# Patient Record
Sex: FEMALE | Race: BLACK OR AFRICAN AMERICAN | NOT HISPANIC OR LATINO | URBAN - METROPOLITAN AREA
[De-identification: names, ages, dates, MRNs, and addresses within clinical notes are randomized per-mention and may not be internally consistent; named-entity substitution may affect disease eponyms.]

---

## 2022-03-02 ENCOUNTER — INPATIENT (INPATIENT)
Facility: HOSPITAL | Age: 27
LOS: 2 days | Discharge: HOME | End: 2022-03-05
Attending: INTERNAL MEDICINE | Admitting: INTERNAL MEDICINE
Payer: MEDICAID

## 2022-03-02 VITALS
SYSTOLIC BLOOD PRESSURE: 116 MMHG | HEIGHT: 66 IN | OXYGEN SATURATION: 100 % | HEART RATE: 113 BPM | DIASTOLIC BLOOD PRESSURE: 76 MMHG | RESPIRATION RATE: 24 BRPM | TEMPERATURE: 98 F

## 2022-03-02 DIAGNOSIS — Z33.2 ENCOUNTER FOR ELECTIVE TERMINATION OF PREGNANCY: Chronic | ICD-10-CM

## 2022-03-02 LAB
ALBUMIN SERPL ELPH-MCNC: 5 G/DL — SIGNIFICANT CHANGE UP (ref 3.3–5)
ALP SERPL-CCNC: 68 U/L — SIGNIFICANT CHANGE UP (ref 40–120)
ALT FLD-CCNC: 62 U/L — HIGH (ref 4–33)
ANION GAP SERPL CALC-SCNC: 19 MMOL/L — HIGH (ref 7–14)
APPEARANCE UR: ABNORMAL
AST SERPL-CCNC: 37 U/L — HIGH (ref 4–32)
BACTERIA # UR AUTO: ABNORMAL
BASOPHILS # BLD AUTO: 0.01 K/UL — SIGNIFICANT CHANGE UP (ref 0–0.2)
BASOPHILS NFR BLD AUTO: 0.2 % — SIGNIFICANT CHANGE UP (ref 0–2)
BILIRUB SERPL-MCNC: 0.6 MG/DL — SIGNIFICANT CHANGE UP (ref 0.2–1.2)
BILIRUB UR-MCNC: NEGATIVE — SIGNIFICANT CHANGE UP
BUN SERPL-MCNC: 13 MG/DL — SIGNIFICANT CHANGE UP (ref 7–23)
CALCIUM SERPL-MCNC: 9.3 MG/DL — SIGNIFICANT CHANGE UP (ref 8.4–10.5)
CHLORIDE SERPL-SCNC: 98 MMOL/L — SIGNIFICANT CHANGE UP (ref 98–107)
CO2 SERPL-SCNC: 17 MMOL/L — LOW (ref 22–31)
COLOR SPEC: YELLOW — SIGNIFICANT CHANGE UP
CREAT SERPL-MCNC: 0.69 MG/DL — SIGNIFICANT CHANGE UP (ref 0.5–1.3)
DIFF PNL FLD: ABNORMAL
EGFR: 122 ML/MIN/1.73M2 — SIGNIFICANT CHANGE UP
EOSINOPHIL # BLD AUTO: 0 K/UL — SIGNIFICANT CHANGE UP (ref 0–0.5)
EOSINOPHIL NFR BLD AUTO: 0 % — SIGNIFICANT CHANGE UP (ref 0–6)
EPI CELLS # UR: 15 /HPF — HIGH (ref 0–5)
GLUCOSE SERPL-MCNC: 141 MG/DL — HIGH (ref 70–99)
GLUCOSE UR QL: NEGATIVE — SIGNIFICANT CHANGE UP
HCT VFR BLD CALC: 45.9 % — HIGH (ref 34.5–45)
HGB BLD-MCNC: 15.6 G/DL — HIGH (ref 11.5–15.5)
HYALINE CASTS # UR AUTO: 6 /LPF — SIGNIFICANT CHANGE UP (ref 0–7)
IANC: 4.94 K/UL — SIGNIFICANT CHANGE UP (ref 1.5–8.5)
IMM GRANULOCYTES NFR BLD AUTO: 0.4 % — SIGNIFICANT CHANGE UP (ref 0–1.5)
KETONES UR-MCNC: ABNORMAL
LEUKOCYTE ESTERASE UR-ACNC: ABNORMAL
LYMPHOCYTES # BLD AUTO: 0.4 K/UL — LOW (ref 1–3.3)
LYMPHOCYTES # BLD AUTO: 7.3 % — LOW (ref 13–44)
MCHC RBC-ENTMCNC: 28.9 PG — SIGNIFICANT CHANGE UP (ref 27–34)
MCHC RBC-ENTMCNC: 34 GM/DL — SIGNIFICANT CHANGE UP (ref 32–36)
MCV RBC AUTO: 85.2 FL — SIGNIFICANT CHANGE UP (ref 80–100)
MONOCYTES # BLD AUTO: 0.12 K/UL — SIGNIFICANT CHANGE UP (ref 0–0.9)
MONOCYTES NFR BLD AUTO: 2.2 % — SIGNIFICANT CHANGE UP (ref 2–14)
NEUTROPHILS # BLD AUTO: 4.94 K/UL — SIGNIFICANT CHANGE UP (ref 1.8–7.4)
NEUTROPHILS NFR BLD AUTO: 89.9 % — HIGH (ref 43–77)
NITRITE UR-MCNC: NEGATIVE — SIGNIFICANT CHANGE UP
NRBC # BLD: 0 /100 WBCS — SIGNIFICANT CHANGE UP
NRBC # FLD: 0 K/UL — SIGNIFICANT CHANGE UP
NT-PROBNP SERPL-SCNC: 17 PG/ML — SIGNIFICANT CHANGE UP
PH UR: 6 — SIGNIFICANT CHANGE UP (ref 5–8)
PLATELET # BLD AUTO: 244 K/UL — SIGNIFICANT CHANGE UP (ref 150–400)
POTASSIUM SERPL-MCNC: 3.7 MMOL/L — SIGNIFICANT CHANGE UP (ref 3.5–5.3)
POTASSIUM SERPL-SCNC: 3.7 MMOL/L — SIGNIFICANT CHANGE UP (ref 3.5–5.3)
PROT SERPL-MCNC: 7.8 G/DL — SIGNIFICANT CHANGE UP (ref 6–8.3)
PROT UR-MCNC: ABNORMAL
RBC # BLD: 5.39 M/UL — HIGH (ref 3.8–5.2)
RBC # FLD: 13.2 % — SIGNIFICANT CHANGE UP (ref 10.3–14.5)
RBC CASTS # UR COMP ASSIST: 3 /HPF — SIGNIFICANT CHANGE UP (ref 0–4)
SODIUM SERPL-SCNC: 134 MMOL/L — LOW (ref 135–145)
SP GR SPEC: 1.03 — SIGNIFICANT CHANGE UP (ref 1–1.05)
TROPONIN T, HIGH SENSITIVITY RESULT: <6 NG/L — SIGNIFICANT CHANGE UP
UROBILINOGEN FLD QL: ABNORMAL
WBC # BLD: 5.49 K/UL — SIGNIFICANT CHANGE UP (ref 3.8–10.5)
WBC # FLD AUTO: 5.49 K/UL — SIGNIFICANT CHANGE UP (ref 3.8–10.5)
WBC UR QL: 16 /HPF — HIGH (ref 0–5)

## 2022-03-02 PROCEDURE — 99285 EMERGENCY DEPT VISIT HI MDM: CPT | Mod: 25

## 2022-03-02 PROCEDURE — 93010 ELECTROCARDIOGRAM REPORT: CPT

## 2022-03-02 NOTE — ED PROVIDER NOTE - CARE PLAN
1 Principal Discharge DX:	Dyspnea   Principal Discharge DX:	Acute respiratory failure with hypoxia  Secondary Diagnosis:	Viral pneumonia

## 2022-03-02 NOTE — ED PROVIDER NOTE - OBJECTIVE STATEMENT
27 yr old female with hx of chronic bronchitis, childhood asthma presents from [facility] for dyspnea, tachypnea and high HR.  Pt states she has episodes of hyperventilation in past but this is new since Monday.  Subjective fevers.  Denies hx of DVT/PE.

## 2022-03-02 NOTE — ED ADULT TRIAGE NOTE - CHIEF COMPLAINT QUOTE
pt c/o SOB, dry cough, CP, diarrhea x 3 days. Sent in from urgent care for further eval. Pt had negative flu and covid tests done at urgent care. Pt fully vaccinated. PMH asthma, bronchitis.

## 2022-03-02 NOTE — ED PROVIDER NOTE - NSICDXPASTMEDICALHX_GEN_ALL_CORE_FT
PAST MEDICAL HISTORY:  Asthma     Chronic bronchitis     Trichomonal vulvovaginitis     UTI (lower urinary tract infection)

## 2022-03-02 NOTE — ED PROVIDER NOTE - NSICDXFAMILYHX_GEN_ALL_CORE_FT
FAMILY HISTORY:  Family history of hypertension    Grandparent  Still living? Unknown  Family history of diabetes mellitus, Age at diagnosis: Age Unknown

## 2022-03-02 NOTE — ED ADULT NURSE NOTE - OBJECTIVE STATEMENT
26y/o female aaox4 and ambulatory c/o sod, [place], and tachycardia. PT denies abdominal pain, N+V, diarrhea. Vital signs as noted. 20g in RAC; labs collected and sent as per MD order.

## 2022-03-02 NOTE — ED ADULT NURSE NOTE - NSIMPLEMENTINTERV_GEN_ALL_ED
Implemented All Universal Safety Interventions:  [place] to call system. Call bell, personal items and telephone within reach. Instruct patient to call for assistance. Room bathroom lighting operational. Non-slip footwear when patient is off stretcher. Physically safe environment: no spills, clutter or unnecessary equipment. Stretcher in lowest position, wheels locked, appropriate side rails in place.

## 2022-03-02 NOTE — ED PROVIDER NOTE - PROGRESS NOTE DETAILS
PA Valane: Pt maintaining an O2 sat of 97 at rest off of supplemental O2 but is breathing at 24. Hypoxic to 90 with ambulation. CTA non-diagnostic for PE, D-Dimer ordered. Procalcitonin ordered to eval for viral vs bacterial etiology.

## 2022-03-03 DIAGNOSIS — J45.901 UNSPECIFIED ASTHMA WITH (ACUTE) EXACERBATION: ICD-10-CM

## 2022-03-03 DIAGNOSIS — R79.89 OTHER SPECIFIED ABNORMAL FINDINGS OF BLOOD CHEMISTRY: ICD-10-CM

## 2022-03-03 DIAGNOSIS — R19.7 DIARRHEA, UNSPECIFIED: ICD-10-CM

## 2022-03-03 DIAGNOSIS — Z29.9 ENCOUNTER FOR PROPHYLACTIC MEASURES, UNSPECIFIED: ICD-10-CM

## 2022-03-03 DIAGNOSIS — J96.01 ACUTE RESPIRATORY FAILURE WITH HYPOXIA: ICD-10-CM

## 2022-03-03 DIAGNOSIS — J10.00 INFLUENZA DUE TO OTHER IDENTIFIED INFLUENZA VIRUS WITH UNSPECIFIED TYPE OF PNEUMONIA: ICD-10-CM

## 2022-03-03 DIAGNOSIS — R31.9 HEMATURIA, UNSPECIFIED: ICD-10-CM

## 2022-03-03 LAB
ALBUMIN SERPL ELPH-MCNC: 4.8 G/DL — SIGNIFICANT CHANGE UP (ref 3.3–5)
ALP SERPL-CCNC: 60 U/L — SIGNIFICANT CHANGE UP (ref 40–120)
ALT FLD-CCNC: 48 U/L — HIGH (ref 4–33)
ANION GAP SERPL CALC-SCNC: 15 MMOL/L — HIGH (ref 7–14)
ANISOCYTOSIS BLD QL: SLIGHT — SIGNIFICANT CHANGE UP
AST SERPL-CCNC: 30 U/L — SIGNIFICANT CHANGE UP (ref 4–32)
B PERT DNA SPEC QL NAA+PROBE: SIGNIFICANT CHANGE UP
B PERT+PARAPERT DNA PNL SPEC NAA+PROBE: SIGNIFICANT CHANGE UP
BASOPHILS # BLD AUTO: 0 K/UL — SIGNIFICANT CHANGE UP (ref 0–0.2)
BASOPHILS NFR BLD AUTO: 0 % — SIGNIFICANT CHANGE UP (ref 0–2)
BILIRUB SERPL-MCNC: 0.5 MG/DL — SIGNIFICANT CHANGE UP (ref 0.2–1.2)
BLOOD GAS VENOUS COMPREHENSIVE RESULT: SIGNIFICANT CHANGE UP
BORDETELLA PARAPERTUSSIS (RAPRVP): SIGNIFICANT CHANGE UP
BUN SERPL-MCNC: 14 MG/DL — SIGNIFICANT CHANGE UP (ref 7–23)
C PNEUM DNA SPEC QL NAA+PROBE: SIGNIFICANT CHANGE UP
CALCIUM SERPL-MCNC: 9.2 MG/DL — SIGNIFICANT CHANGE UP (ref 8.4–10.5)
CHLORIDE SERPL-SCNC: 101 MMOL/L — SIGNIFICANT CHANGE UP (ref 98–107)
CO2 SERPL-SCNC: 19 MMOL/L — LOW (ref 22–31)
CREAT SERPL-MCNC: 0.67 MG/DL — SIGNIFICANT CHANGE UP (ref 0.5–1.3)
CRP SERPL-MCNC: 9.3 MG/L — HIGH
D DIMER BLD IA.RAPID-MCNC: 406 NG/ML DDU — HIGH
EGFR: 123 ML/MIN/1.73M2 — SIGNIFICANT CHANGE UP
EOSINOPHIL # BLD AUTO: 0 K/UL — SIGNIFICANT CHANGE UP (ref 0–0.5)
EOSINOPHIL NFR BLD AUTO: 0 % — SIGNIFICANT CHANGE UP (ref 0–6)
ERYTHROCYTE [SEDIMENTATION RATE] IN BLOOD: 16 MM/HR — SIGNIFICANT CHANGE UP (ref 4–25)
FLUAV H1 2009 PAND RNA SPEC QL NAA+PROBE: DETECTED
FLUBV RNA SPEC QL NAA+PROBE: SIGNIFICANT CHANGE UP
GIANT PLATELETS BLD QL SMEAR: PRESENT — SIGNIFICANT CHANGE UP
GLUCOSE SERPL-MCNC: 135 MG/DL — HIGH (ref 70–99)
HADV DNA SPEC QL NAA+PROBE: SIGNIFICANT CHANGE UP
HCOV 229E RNA SPEC QL NAA+PROBE: SIGNIFICANT CHANGE UP
HCOV HKU1 RNA SPEC QL NAA+PROBE: SIGNIFICANT CHANGE UP
HCOV NL63 RNA SPEC QL NAA+PROBE: SIGNIFICANT CHANGE UP
HCOV OC43 RNA SPEC QL NAA+PROBE: SIGNIFICANT CHANGE UP
HCT VFR BLD CALC: 43.2 % — SIGNIFICANT CHANGE UP (ref 34.5–45)
HGB BLD-MCNC: 14.5 G/DL — SIGNIFICANT CHANGE UP (ref 11.5–15.5)
HMPV RNA SPEC QL NAA+PROBE: SIGNIFICANT CHANGE UP
HPIV1 RNA SPEC QL NAA+PROBE: SIGNIFICANT CHANGE UP
HPIV2 RNA SPEC QL NAA+PROBE: SIGNIFICANT CHANGE UP
HPIV3 RNA SPEC QL NAA+PROBE: SIGNIFICANT CHANGE UP
HPIV4 RNA SPEC QL NAA+PROBE: SIGNIFICANT CHANGE UP
IANC: 1.96 K/UL — SIGNIFICANT CHANGE UP (ref 1.5–8.5)
LYMPHOCYTES # BLD AUTO: 0.36 K/UL — LOW (ref 1–3.3)
LYMPHOCYTES # BLD AUTO: 12 % — LOW (ref 13–44)
M PNEUMO DNA SPEC QL NAA+PROBE: SIGNIFICANT CHANGE UP
MAGNESIUM SERPL-MCNC: 2 MG/DL — SIGNIFICANT CHANGE UP (ref 1.6–2.6)
MANUAL SMEAR VERIFICATION: SIGNIFICANT CHANGE UP
MCHC RBC-ENTMCNC: 28.5 PG — SIGNIFICANT CHANGE UP (ref 27–34)
MCHC RBC-ENTMCNC: 33.6 GM/DL — SIGNIFICANT CHANGE UP (ref 32–36)
MCV RBC AUTO: 84.9 FL — SIGNIFICANT CHANGE UP (ref 80–100)
MONOCYTES # BLD AUTO: 0.31 K/UL — SIGNIFICANT CHANGE UP (ref 0–0.9)
MONOCYTES NFR BLD AUTO: 10.2 % — SIGNIFICANT CHANGE UP (ref 2–14)
NEUTROPHILS # BLD AUTO: 2.11 K/UL — SIGNIFICANT CHANGE UP (ref 1.8–7.4)
NEUTROPHILS NFR BLD AUTO: 68.5 % — SIGNIFICANT CHANGE UP (ref 43–77)
NEUTS BAND # BLD: 1.9 % — SIGNIFICANT CHANGE UP (ref 0–6)
PHOSPHATE SERPL-MCNC: 2.9 MG/DL — SIGNIFICANT CHANGE UP (ref 2.5–4.5)
PLAT MORPH BLD: NORMAL — SIGNIFICANT CHANGE UP
PLATELET # BLD AUTO: 225 K/UL — SIGNIFICANT CHANGE UP (ref 150–400)
PLATELET COUNT - ESTIMATE: NORMAL — SIGNIFICANT CHANGE UP
POIKILOCYTOSIS BLD QL AUTO: SLIGHT — SIGNIFICANT CHANGE UP
POTASSIUM SERPL-MCNC: 4.1 MMOL/L — SIGNIFICANT CHANGE UP (ref 3.5–5.3)
POTASSIUM SERPL-SCNC: 4.1 MMOL/L — SIGNIFICANT CHANGE UP (ref 3.5–5.3)
PROCALCITONIN SERPL-MCNC: 0.02 NG/ML — SIGNIFICANT CHANGE UP (ref 0.02–0.1)
PROT SERPL-MCNC: 7.8 G/DL — SIGNIFICANT CHANGE UP (ref 6–8.3)
RAPID RVP RESULT: DETECTED
RBC # BLD: 5.09 M/UL — SIGNIFICANT CHANGE UP (ref 3.8–5.2)
RBC # FLD: 13 % — SIGNIFICANT CHANGE UP (ref 10.3–14.5)
RBC BLD AUTO: ABNORMAL
RSV RNA SPEC QL NAA+PROBE: SIGNIFICANT CHANGE UP
RV+EV RNA SPEC QL NAA+PROBE: SIGNIFICANT CHANGE UP
SARS-COV-2 RNA SPEC QL NAA+PROBE: SIGNIFICANT CHANGE UP
SMUDGE CELLS # BLD: PRESENT — SIGNIFICANT CHANGE UP
SODIUM SERPL-SCNC: 135 MMOL/L — SIGNIFICANT CHANGE UP (ref 135–145)
TARGETS BLD QL SMEAR: SLIGHT — SIGNIFICANT CHANGE UP
VARIANT LYMPHS # BLD: 7.4 % — HIGH (ref 0–6)
WBC # BLD: 3 K/UL — LOW (ref 3.8–10.5)
WBC # FLD AUTO: 3 K/UL — LOW (ref 3.8–10.5)

## 2022-03-03 PROCEDURE — 99223 1ST HOSP IP/OBS HIGH 75: CPT

## 2022-03-03 PROCEDURE — 74177 CT ABD & PELVIS W/CONTRAST: CPT | Mod: 26

## 2022-03-03 PROCEDURE — 71275 CT ANGIOGRAPHY CHEST: CPT | Mod: 26,MA

## 2022-03-03 RX ORDER — IPRATROPIUM BROMIDE 0.2 MG/ML
500 SOLUTION, NON-ORAL INHALATION EVERY 6 HOURS
Refills: 0 | Status: DISCONTINUED | OUTPATIENT
Start: 2022-03-03 | End: 2022-03-05

## 2022-03-03 RX ORDER — INFLUENZA VIRUS VACCINE 15; 15; 15; 15 UG/.5ML; UG/.5ML; UG/.5ML; UG/.5ML
0.5 SUSPENSION INTRAMUSCULAR ONCE
Refills: 0 | Status: DISCONTINUED | OUTPATIENT
Start: 2022-03-03 | End: 2022-03-05

## 2022-03-03 RX ORDER — ACETAMINOPHEN 500 MG
650 TABLET ORAL EVERY 6 HOURS
Refills: 0 | Status: DISCONTINUED | OUTPATIENT
Start: 2022-03-03 | End: 2022-03-05

## 2022-03-03 RX ORDER — DEXAMETHASONE 0.5 MG/5ML
6 ELIXIR ORAL ONCE
Refills: 0 | Status: COMPLETED | OUTPATIENT
Start: 2022-03-03 | End: 2022-03-03

## 2022-03-03 RX ORDER — SODIUM CHLORIDE 9 MG/ML
1000 INJECTION, SOLUTION INTRAVENOUS
Refills: 0 | Status: DISCONTINUED | OUTPATIENT
Start: 2022-03-03 | End: 2022-03-05

## 2022-03-03 RX ORDER — LANOLIN ALCOHOL/MO/W.PET/CERES
3 CREAM (GRAM) TOPICAL AT BEDTIME
Refills: 0 | Status: DISCONTINUED | OUTPATIENT
Start: 2022-03-03 | End: 2022-03-05

## 2022-03-03 RX ADMIN — SODIUM CHLORIDE 75 MILLILITER(S): 9 INJECTION, SOLUTION INTRAVENOUS at 21:22

## 2022-03-03 RX ADMIN — Medication 100 MILLIGRAM(S): at 22:35

## 2022-03-03 RX ADMIN — Medication 500 MICROGRAM(S): at 10:38

## 2022-03-03 RX ADMIN — Medication 6 MILLIGRAM(S): at 04:12

## 2022-03-03 RX ADMIN — Medication 500 MICROGRAM(S): at 17:57

## 2022-03-03 RX ADMIN — Medication 75 MILLIGRAM(S): at 07:31

## 2022-03-03 RX ADMIN — Medication 40 MILLIGRAM(S): at 21:30

## 2022-03-03 RX ADMIN — Medication 500 MICROGRAM(S): at 23:05

## 2022-03-03 RX ADMIN — SODIUM CHLORIDE 75 MILLILITER(S): 9 INJECTION, SOLUTION INTRAVENOUS at 07:31

## 2022-03-03 RX ADMIN — Medication 75 MILLIGRAM(S): at 17:57

## 2022-03-03 RX ADMIN — Medication 40 MILLIGRAM(S): at 10:38

## 2022-03-03 NOTE — H&P ADULT - HISTORY OF PRESENT ILLNESS
This is a 26 y/o F with multiple complaints. She has hx of asthma since childhood, presented to the ED for new onset shortness of breath started 4 days ago. The patient works in a medical office. She states she started having a dry cough starting on 4 days ago, then the next day she had developed new onset body aches throughout, chest pains and feeling lethargic. Saliva when coughed out has tiny specs of blood. Denies phlegm The patient also had shortness of breath with exertion (she walked to the bathroom and back with no issues in the ED but stated she had shortness of breath. She is covid vaccinated x2 and flu vaccinated. The patient also endorsed n/v around the same time. She reports eating sushi 2 weeks ago. Diarrhea occurred today and had gone 5 times today, also stated that it is mixed with blood, however she is currently on her period. States that she never had blood in stool before. Denies hx of travel. Denies fevers, highest checked at home was 99F.

## 2022-03-03 NOTE — H&P ADULT - PROBLEM SELECTOR PLAN 1
Assessment:  - patient presented for new onset shortness of breath with associated diarrhea and joint pains  - suspicious for viral infection  - PCR for common viruses and covid pending  - CTA shows Scattered sub-cm nodular groundglass opacities, most pronounced in the right middle and right lower lobe. Findings nonspecific for atypical viral pneumonia.    Plan:  - will start solumedrol 40mg Q12hr, titrate down as needed  - patient states she cannot tolerate albuterol because it causes tachycardia. Patient refusing albuterol. Will give atrovent for now  - f/u covid19 test, PCR panel  - monitor respiratory status - currently on RA  - hold abx for now since there is no single consolidation on imaging, vital signs stable and afebrile, will f/u with procal level Assessment:  - patient presented for new onset shortness of breath with associated diarrhea and joint pains  - suspicious for viral infection  - PCR for common viruses and covid pending  - CTA shows Scattered sub-cm nodular groundglass opacities, most pronounced in the right middle and right lower lobe. Findings nonspecific for atypical viral pneumonia.    Plan:  - will start solumedrol 40mg Q12hr, titrate down as needed  - patient states she cannot tolerate albuterol because it causes tachycardia. Patient refusing albuterol. Will give atrovent for now  - f/u covid19 test, PCR panel  - monitor respiratory status - currently on RA ****update: Flu positive***  - start on tamiflu for 5 days  - hold abx for now since there is no single consolidation on imaging, vital signs stable and afebrile, will f/u with procal level Assessment:  - patient is flu A positive on PCR  - CTA showed sub cm ground glass opacities  - likely cause of asthma exacerbation    Plan:  - start tamiflu for 5 days  - rest of plan for asthma exacerbation as below

## 2022-03-03 NOTE — PATIENT PROFILE ADULT - FALL HARM RISK - RISK INTERVENTIONS
Bed in lowest position, wheels locked, appropriate side rails in place/Call bell, personal items and telephone in reach/Instruct patient to call for assistance before getting out of bed or chair/[place] to call system/Physically safe environment - no spills, clutter or unnecessary equipment

## 2022-03-03 NOTE — H&P ADULT - NSHPPHYSICALEXAM_GEN_ALL_CORE
PHYSICAL EXAM:  VITALS: Vital Signs Last 24 Hrs  T(C): 36.7 (03 Mar 2022 04:06), Max: 36.9 (03 Mar 2022 00:21)  T(F): 98 (03 Mar 2022 04:06), Max: 98.4 (03 Mar 2022 00:21)  HR: 90 (03 Mar 2022 04:06) (90 - 113)  BP: 108/79 (03 Mar 2022 04:06) (108/79 - 122/84)  BP(mean): --  RR: 20 (03 Mar 2022 04:06) (16 - 24)  SpO2: 98% (03 Mar 2022 04:06) (90% - 100%)  GENERAL: NAD, well-developed, well-nourished, patient speaking in complete sentences with no difficulty  HEAD:  Atraumatic, Normocephalic  EYES: EOMI, PERRL, conjunctiva and sclera clear  ENT: Moist Mucus Membranes present, no ulcers appreciated  NECK: Supple, No JVD  CHEST/LUNG: Clear to auscultation bilaterally; No wheezes, rales or rhonchi, no accessory muscle use  HEART: Regular rate and rhythm; No murmurs, rubs, or gallops, (+)S1, S2  ABDOMEN: Soft, + mild tenderness in the upper quadrants b/l, Nondistended; Normal Bowel sounds   EXTREMITIES:  2+ Peripheral Pulses, No clubbing, cyanosis, or edema  PSYCH: normal mood and affect  NEUROLOGY: AAOx3, non-focal  SKIN: No rashes or lesions  RECTAL: empty rectal vault (done with RN: Jena Mark at bedside)

## 2022-03-03 NOTE — ED ADULT NURSE REASSESSMENT NOTE - NS ED NURSE REASSESS COMMENT FT1
report received from overnight RN. pt is a&ox4. NAD. boyfriend at bedside. respirations are even and un labored. safety precautions maintained.
pt is agitated. pt states she is "tired of being in the same room and that we keep interrupting her sleep. " boyfriend at bedside. pt educated on wait for a room on a floor and the neccessity of administering medications at scheduled times. safety precautions maintained.

## 2022-03-03 NOTE — H&P ADULT - PROBLEM SELECTOR PLAN 2
Assessment:  - patient states she has bloody diarrhea today however also on menses  - hx of hemorrhoids however does not think it is from hemorrhoids as per patient  - states she sees bright red mixed with stool  - rectal exam unremarkable, no blood on exam  - hx of eating sushi 2 weeks ago-> colitis/gastroenteritis?, also pending covid test at this time    Plan:  - hb stable at 15.6, vital signs stable. Low suspicion for active GI bleed  - blood likely from active menses  - will send GI PCR, stool cultures and occult stool test for work up of diarrhea  - will order CT abd/pelvis to evaluate for colitis Assessment:  - patient presented for new onset shortness of breath with associated diarrhea and joint pains  - suspicious for viral infection  - PCR for common viruses and covid pending  - CTA shows Scattered sub-cm nodular groundglass opacities, most pronounced in the right middle and right lower lobe. Findings nonspecific for atypical viral pneumonia.    Plan:  - will start solumedrol 40mg Q12hr, titrate down as needed  - patient states she cannot tolerate albuterol because it causes tachycardia. Patient refusing albuterol. Will give Atrovent for now  - f/u covid19 test, PCR panel  - monitor respiratory status - currently on RA  - hold abx for now since there is no single consolidation on imaging, vital signs stable and afebrile, will f/u with procal level

## 2022-03-03 NOTE — H&P ADULT - NSHPREVIEWOFSYSTEMS_GEN_ALL_CORE
REVIEW OF SYSTEMS:    CONSTITUTIONAL: No weakness, fevers or chills  EYES/ENT: No visual changes;  No dysphagia; No sore throat; No rhinorrhea; No sinus pain/pressure  NECK: No pain or stiffness  RESPIRATORY: + dry cough, no wheezing, + hemoptysis; + shortness of breath  CARDIOVASCULAR: + chest pain,  no palpitations; No lower extremity edema  GASTROINTESTINAL: No abdominal or epigastric pain. + nausea, vomiting, no hematemesis; + diarrhea, no constipation. + blood in stool  GENITOURINARY: No dysuria, frequency or hematuria  NEUROLOGICAL: No numbness or weakness  MSK: ambulates without assistance  SKIN: No itching, burning, rashes, or lesions   All other review of systems is negative unless indicated above.

## 2022-03-03 NOTE — H&P ADULT - NSHPLABSRESULTS_GEN_ALL_CORE
15.6   5.49  )-----------( 244      ( 02 Mar 2022 21:04 )             45.9       03-02    134<L>  |  98  |  13  ----------------------------<  141<H>  3.7   |  17<L>  |  0.69    Ca    9.3      02 Mar 2022 21:04    TPro  7.8  /  Alb  5.0  /  TBili  0.6  /  DBili  x   /  AST  37<H>  /  ALT  62<H>  /  AlkPhos  68                  04:39 - VBG - pH: 7.40  | pCO2: 34    | pO2: 50    | Lactate: 1.6        Urinalysis Basic - ( 02 Mar 2022 22:16 )    Color: Yellow / Appearance: Slightly Turbid / S.030 / pH: x  Gluc: x / Ketone: Large  / Bili: Negative / Urobili: 3 mg/dL   Blood: x / Protein: 100 mg/dL / Nitrite: Negative   Leuk Esterase: Moderate / RBC: 3 /HPF / WBC 16 /HPF   Sq Epi: x / Non Sq Epi: 15 /HPF / Bacteria: Moderate

## 2022-03-03 NOTE — H&P ADULT - NSHPSOCIALHISTORY_GEN_ALL_CORE
Marijuana use daily, denies ETOH use, denies tobacco use,  LMP 2/3 - currently active having menses  not sexually active, no vaginal discharge

## 2022-03-03 NOTE — H&P ADULT - PROBLEM SELECTOR PLAN 3
- elevated d-dimer  - CTA neg for PE Assessment:  - patient states she has bloody diarrhea today however also on menses  - hx of hemorrhoids however does not think it is from hemorrhoids as per patient  - states she sees bright red mixed with stool  - rectal exam unremarkable, no blood on exam  - hx of eating sushi 2 weeks ago-> colitis/gastroenteritis?, also pending covid test at this time    Plan:  - hb stable at 15.6, vital signs stable. Low suspicion for active GI bleed  - blood likely from active menses  - will send GI PCR, stool cultures and occult stool test for work up of diarrhea  - will order CT abd/pelvis to evaluate for colitis

## 2022-03-03 NOTE — H&P ADULT - ASSESSMENT
26 y/o F with multiple complaints,  hx of asthma since childhood, presented to the ED for new onset shortness of breath started 4 days ago and also has diarrhea "mixed" with blood. Patient currently having menses. Admit for viral pneumonia causing asthma exacerbation.

## 2022-03-04 LAB
CULTURE RESULTS: SIGNIFICANT CHANGE UP
SPECIMEN SOURCE: SIGNIFICANT CHANGE UP

## 2022-03-04 RX ADMIN — Medication 40 MILLIGRAM(S): at 07:43

## 2022-03-04 RX ADMIN — Medication 500 MICROGRAM(S): at 04:34

## 2022-03-04 RX ADMIN — Medication 500 MICROGRAM(S): at 23:30

## 2022-03-04 RX ADMIN — Medication 75 MILLIGRAM(S): at 07:44

## 2022-03-04 RX ADMIN — Medication 75 MILLIGRAM(S): at 18:35

## 2022-03-04 RX ADMIN — Medication 40 MILLIGRAM(S): at 17:53

## 2022-03-04 RX ADMIN — Medication 3 MILLIGRAM(S): at 00:10

## 2022-03-04 NOTE — PROGRESS NOTE ADULT - SUBJECTIVE AND OBJECTIVE BOX
Patient is a 27y old  Female who presents with a chief complaint of shortness of breath (03 Mar 2022 05:11)      INTERVAL HPI/OVERNIGHT EVENTS:  T(C): 36.7 (03-04-22 @ 21:00), Max: 36.7 (03-04-22 @ 21:00)  HR: 74 (03-04-22 @ 21:00) (74 - 80)  BP: 107/70 (03-04-22 @ 21:00) (107/70 - 111/73)  RR: 18 (03-04-22 @ 21:00) (18 - 19)  SpO2: 98% (03-04-22 @ 21:00) (95% - 98%)  Wt(kg): --  I&O's Summary      PAST MEDICAL & SURGICAL HISTORY:  Asthma    Chronic bronchitis    Trichomonal vulvovaginitis    UTI (lower urinary tract infection)    Encounter for elective termination of pregnancy        SOCIAL HISTORY  Alcohol:  Tobacco:  Illicit substance use:    FAMILY HISTORY:    REVIEW OF SYSTEMS:  CONSTITUTIONAL: No fever, weight loss, or fatigue  EYES: No eye pain, visual disturbances, or discharge  ENMT:  No difficulty hearing, tinnitus, vertigo; No sinus or throat pain  NECK: No pain or stiffness  RESPIRATORY: No cough, wheezing, chills or hemoptysis; No shortness of breath  CARDIOVASCULAR: No chest pain, palpitations, dizziness, or leg swelling  GASTROINTESTINAL: No abdominal or epigastric pain. No nausea, vomiting, or hematemesis; No diarrhea or constipation. No melena or hematochezia.  GENITOURINARY: No dysuria, frequency, hematuria, or incontinence  NEUROLOGICAL: No headaches, memory loss, loss of strength, numbness, or tremors  SKIN: No itching, burning, rashes, or lesions   LYMPH NODES: No enlarged glands  ENDOCRINE: No heat or cold intolerance; No hair loss  MUSCULOSKELETAL: No joint pain or swelling; No muscle, back, or extremity pain  PSYCHIATRIC: No depression, anxiety, mood swings, or difficulty sleeping  HEME/LYMPH: No easy bruising, or bleeding gums  ALLERY AND IMMUNOLOGIC: No hives or eczema    RADIOLOGY & ADDITIONAL TESTS:    Imaging Personally Reviewed:  [ ] YES  [ ] NO    Consultant(s) Notes Reviewed:  [ ] YES  [ ] NO    PHYSICAL EXAM:  GENERAL: NAD, well-groomed, well-developed  HEAD:  Atraumatic, Normocephalic  EYES: EOMI, PERRLA, conjunctiva and sclera clear  ENMT: No tonsillar erythema, exudates, or enlargement; Moist mucous membranes, Good dentition, No lesions  NECK: Supple, No JVD, Normal thyroid  NERVOUS SYSTEM:  Alert & Oriented X3, Good concentration; Motor Strength 5/5 B/L upper and lower extremities; DTRs 2+ intact and symmetric  CHEST/LUNG: Clear to percussion bilaterally; No rales, rhonchi, wheezing, or rubs  HEART: Regular rate and rhythm; No murmurs, rubs, or gallops  ABDOMEN: Soft, Nontender, Nondistended; Bowel sounds present  EXTREMITIES:  2+ Peripheral Pulses, No clubbing, cyanosis, or edema  LYMPH: No lymphadenopathy noted  SKIN: No rashes or lesions    LABS:                        14.5   3.00  )-----------( 225      ( 03 Mar 2022 06:58 )             43.2     03-03    135  |  101  |  14  ----------------------------<  135<H>  4.1   |  19<L>  |  0.67    Ca    9.2      03 Mar 2022 06:58  Phos  2.9     03-03  Mg     2.00     03-03    TPro  7.8  /  Alb  4.8  /  TBili  0.5  /  DBili  x   /  AST  30  /  ALT  48<H>  /  AlkPhos  60  03-03        CAPILLARY BLOOD GLUCOSE                MEDICATIONS  (STANDING):  influenza   Vaccine 0.5 milliLiter(s) IntraMuscular once  ipratropium    for Nebulization 500 MICROGram(s) Nebulizer every 6 hours  lactated ringers. 1000 milliLiter(s) (75 mL/Hr) IV Continuous <Continuous>  methylPREDNISolone sodium succinate Injectable 40 milliGRAM(s) IV Push every 12 hours  oseltamivir 75 milliGRAM(s) Oral two times a day    MEDICATIONS  (PRN):  acetaminophen     Tablet .. 650 milliGRAM(s) Oral every 6 hours PRN Temp greater or equal to 38C (100.4F), Mild Pain (1 - 3)  benzonatate 100 milliGRAM(s) Oral three times a day PRN Cough  melatonin 3 milliGRAM(s) Oral at bedtime PRN Insomnia      Care Discussed with Consultants/Other Providers [ ] YES  [ ] NO Patient is a 27y old  Female who presents with a chief complaint of shortness of breath (03 Mar 2022 05:11)      INTERVAL HPI/OVERNIGHT EVENTS: seen and examined, wheezing improving   T(C): 36.7 (03-04-22 @ 21:00), Max: 36.7 (03-04-22 @ 21:00)  HR: 74 (03-04-22 @ 21:00) (74 - 80)  BP: 107/70 (03-04-22 @ 21:00) (107/70 - 111/73)  RR: 18 (03-04-22 @ 21:00) (18 - 19)  SpO2: 98% (03-04-22 @ 21:00) (95% - 98%)  Wt(kg): --  I&O's Summary      PAST MEDICAL & SURGICAL HISTORY:  Asthma    Chronic bronchitis    Trichomonal vulvovaginitis    UTI (lower urinary tract infection)    Encounter for elective termination of pregnancy        SOCIAL HISTORY  Alcohol:  Tobacco:  Illicit substance use:    FAMILY HISTORY:    REVIEW OF SYSTEMS:  CONSTITUTIONAL: No fever, weight loss, or fatigue  EYES: No eye pain, visual disturbances, or discharge  ENMT:  No difficulty hearing, tinnitus, vertigo; No sinus or throat pain  NECK: No pain or stiffness  RESPIRATORY: No cough, wheezing, chills or hemoptysis; No shortness of breath  CARDIOVASCULAR: No chest pain, palpitations, dizziness, or leg swelling  GASTROINTESTINAL: No abdominal or epigastric pain. No nausea, vomiting, or hematemesis; No diarrhea or constipation. No melena or hematochezia.  GENITOURINARY: No dysuria, frequency, hematuria, or incontinence  NEUROLOGICAL: No headaches, memory loss, loss of strength, numbness, or tremors  SKIN: No itching, burning, rashes, or lesions   LYMPH NODES: No enlarged glands  ENDOCRINE: No heat or cold intolerance; No hair loss  MUSCULOSKELETAL: No joint pain or swelling; No muscle, back, or extremity pain  PSYCHIATRIC: No depression, anxiety, mood swings, or difficulty sleeping  HEME/LYMPH: No easy bruising, or bleeding gums  ALLERY AND IMMUNOLOGIC: No hives or eczema    RADIOLOGY & ADDITIONAL TESTS:    Imaging Personally Reviewed:  [ ] YES  [ ] NO    Consultant(s) Notes Reviewed:  [ ] YES  [ ] NO    PHYSICAL EXAM:  GENERAL: NAD, well-groomed, well-developed  HEAD:  Atraumatic, Normocephalic  EYES: EOMI, PERRLA, conjunctiva and sclera clear  ENMT: No tonsillar erythema, exudates, or enlargement; Moist mucous membranes, Good dentition, No lesions  NECK: Supple, No JVD, Normal thyroid  NERVOUS SYSTEM:  Alert & Oriented X3, Good concentration; Motor Strength 5/5 B/L upper and lower extremities; DTRs 2+ intact and symmetric  CHEST/LUNG: Clear to percussion bilaterally; No rales, rhonchi, wheezing, or rubs  HEART: Regular rate and rhythm; No murmurs, rubs, or gallops  ABDOMEN: Soft, Nontender, Nondistended; Bowel sounds present  EXTREMITIES:  2+ Peripheral Pulses, No clubbing, cyanosis, or edema  LYMPH: No lymphadenopathy noted  SKIN: No rashes or lesions    LABS:                        14.5   3.00  )-----------( 225      ( 03 Mar 2022 06:58 )             43.2     03-03    135  |  101  |  14  ----------------------------<  135<H>  4.1   |  19<L>  |  0.67    Ca    9.2      03 Mar 2022 06:58  Phos  2.9     03-03  Mg     2.00     03-03    TPro  7.8  /  Alb  4.8  /  TBili  0.5  /  DBili  x   /  AST  30  /  ALT  48<H>  /  AlkPhos  60  03-03        CAPILLARY BLOOD GLUCOSE                MEDICATIONS  (STANDING):  influenza   Vaccine 0.5 milliLiter(s) IntraMuscular once  ipratropium    for Nebulization 500 MICROGram(s) Nebulizer every 6 hours  lactated ringers. 1000 milliLiter(s) (75 mL/Hr) IV Continuous <Continuous>  methylPREDNISolone sodium succinate Injectable 40 milliGRAM(s) IV Push every 12 hours  oseltamivir 75 milliGRAM(s) Oral two times a day    MEDICATIONS  (PRN):  acetaminophen     Tablet .. 650 milliGRAM(s) Oral every 6 hours PRN Temp greater or equal to 38C (100.4F), Mild Pain (1 - 3)  benzonatate 100 milliGRAM(s) Oral three times a day PRN Cough  melatonin 3 milliGRAM(s) Oral at bedtime PRN Insomnia      Care Discussed with Consultants/Other Providers [ ] YES  [ ] NO

## 2022-03-04 NOTE — PROGRESS NOTE ADULT - PROBLEM SELECTOR PLAN 1
Assessment:  - patient is flu A positive on PCR  - CTA showed sub cm ground glass opacities  - likely cause of asthma exacerbation    Plan:  - start tamiflu for 5 days  - rest of plan for asthma exacerbation as below

## 2022-03-05 ENCOUNTER — TRANSCRIPTION ENCOUNTER (OUTPATIENT)
Age: 27
End: 2022-03-05

## 2022-03-05 VITALS
HEART RATE: 61 BPM | OXYGEN SATURATION: 97 % | DIASTOLIC BLOOD PRESSURE: 73 MMHG | SYSTOLIC BLOOD PRESSURE: 118 MMHG | RESPIRATION RATE: 18 BRPM | TEMPERATURE: 98 F

## 2022-03-05 DIAGNOSIS — J12.9 VIRAL PNEUMONIA, UNSPECIFIED: ICD-10-CM

## 2022-03-05 LAB
ALBUMIN SERPL ELPH-MCNC: 4.2 G/DL — SIGNIFICANT CHANGE UP (ref 3.3–5)
ALP SERPL-CCNC: 43 U/L — SIGNIFICANT CHANGE UP (ref 40–120)
ALT FLD-CCNC: 26 U/L — SIGNIFICANT CHANGE UP (ref 4–33)
ANION GAP SERPL CALC-SCNC: 11 MMOL/L — SIGNIFICANT CHANGE UP (ref 7–14)
AST SERPL-CCNC: 15 U/L — SIGNIFICANT CHANGE UP (ref 4–32)
BILIRUB SERPL-MCNC: 0.6 MG/DL — SIGNIFICANT CHANGE UP (ref 0.2–1.2)
BUN SERPL-MCNC: 14 MG/DL — SIGNIFICANT CHANGE UP (ref 7–23)
CALCIUM SERPL-MCNC: 8.8 MG/DL — SIGNIFICANT CHANGE UP (ref 8.4–10.5)
CHLORIDE SERPL-SCNC: 102 MMOL/L — SIGNIFICANT CHANGE UP (ref 98–107)
CO2 SERPL-SCNC: 23 MMOL/L — SIGNIFICANT CHANGE UP (ref 22–31)
CREAT SERPL-MCNC: 0.66 MG/DL — SIGNIFICANT CHANGE UP (ref 0.5–1.3)
EGFR: 123 ML/MIN/1.73M2 — SIGNIFICANT CHANGE UP
GLUCOSE SERPL-MCNC: 113 MG/DL — HIGH (ref 70–99)
MAGNESIUM SERPL-MCNC: 1.9 MG/DL — SIGNIFICANT CHANGE UP (ref 1.6–2.6)
PHOSPHATE SERPL-MCNC: 3.4 MG/DL — SIGNIFICANT CHANGE UP (ref 2.5–4.5)
POTASSIUM SERPL-MCNC: 3.6 MMOL/L — SIGNIFICANT CHANGE UP (ref 3.5–5.3)
POTASSIUM SERPL-SCNC: 3.6 MMOL/L — SIGNIFICANT CHANGE UP (ref 3.5–5.3)
PROT SERPL-MCNC: 6.7 G/DL — SIGNIFICANT CHANGE UP (ref 6–8.3)
SODIUM SERPL-SCNC: 136 MMOL/L — SIGNIFICANT CHANGE UP (ref 135–145)

## 2022-03-05 RX ADMIN — Medication 40 MILLIGRAM(S): at 06:02

## 2022-03-05 RX ADMIN — Medication 75 MILLIGRAM(S): at 06:02

## 2022-03-05 NOTE — DISCHARGE NOTE PROVIDER - HOSPITAL COURSE
28 y/o F with multiple complaints,  hx of asthma since childhood, presented to the ED for new onset shortness of breath started 4 days ago and also has diarrhea "mixed" with blood. Patient currently having menses. Admit for viral pneumonia causing asthma exacerbation.       Problem/Plan - 1:  ·  Problem: Pneumonia due to influenza A virus.   ·  Plan: Assessment:  - patient is flu A positive on PCR  - CTA showed sub cm ground glass opacities  - likely cause of asthma exacerbation  - start tamiflu for 5 days  - rest of plan for asthma exacerbation as below.     Problem/Plan - 2:  ·  Problem: Acute asthma exacerbation.   ·  Plan: improving , no wheezing   change to prednisone 40 mg daily.  will discontinue on discharge      Problem/Plan - 3:  ·  Problem: Bloody diarrhea.   ·  Plan: likely menses   H/h remain stable.  no further workup necessary      Problem/Plan - 4:  ·  Problem: Elevated d-dimer.   ·  Plan: - elevated d-dimer  - CTA neg for PE.     Problem/Plan - 5:  ·  Problem: Blood in urine.   ·  Plan: - likely from active menses, patient denies dysuria.     Problem/Plan - 6:  ·  Problem: Prophylactic measure.   ·  Plan: low risk for DVT.    Dispo: home     On 3/5/2022, case was discussed with Dr. Zeeshan Perry, patient is medically cleared and optimized for discharge today.   All medications were reviewed with attending, and sent to mutually agreed upon pharmacy.

## 2022-03-05 NOTE — DISCHARGE NOTE PROVIDER - NSDCCPCAREPLAN_GEN_ALL_CORE_FT
PRINCIPAL DISCHARGE DIAGNOSIS  Diagnosis: Acute respiratory failure with hypoxia  Assessment and Plan of Treatment: Continue medications as prescribed. Follow up with your primary care doctor for further evaluation and management. Please call to make an appointment within 1-2 weeks of discharge.  Continue Tamiflu for a total of 5 days         SECONDARY DISCHARGE DIAGNOSES  Diagnosis: Acute asthma exacerbation  Assessment and Plan of Treatment: Smoking cessation, continue current medications as prescribed. Follow up with your routine physician's appointments.      Diagnosis: Pneumonia due to influenza A virus  Assessment and Plan of Treatment: Continue Tamiflu for a total of 5 days

## 2022-03-05 NOTE — DISCHARGE NOTE NURSING/CASE MANAGEMENT/SOCIAL WORK - PATIENT PORTAL LINK FT
You can access the FollowMyHealth Patient Portal offered by [facility] by registering at the following website: [email or web address]. By joining [employer]’s FollowMyHealth portal, you will also be able to view your health information using other applications (apps) compatible with our system.

## 2022-03-05 NOTE — DISCHARGE NOTE NURSING/CASE MANAGEMENT/SOCIAL WORK - NSDCPEFALRISK_GEN_ALL_CORE
For information on Fall & Injury Prevention, visit: [email or web address] OR  [email or web address] OR  https://www.cdc.gov/steadi/patient.html

## 2022-03-05 NOTE — DISCHARGE NOTE PROVIDER - NSFOLLOWUPCLINICS_GEN_ALL_ED_FT
[facility] Pulmonolgy and Sleep Medicine  Pulmonology  [address] [place] [address], [place] [address]  Phone: [phone]  Fax:   Follow Up Time: 1 week

## 2023-05-13 NOTE — PATIENT PROFILE ADULT - HOW PATIENT ADDRESSED, PROFILE
Chief complaint    Patient is a 87y old  Female who presents with a chief complaint of SOB/GARRISON, swelling of hands/legs/face (12 May 2023 22:26)   Review of systems  Patient appears comfortable.    Labs and Fingersticks  CAPILLARY BLOOD GLUCOSE      POCT Blood Glucose.: 221 mg/dL (13 May 2023 12:43)  POCT Blood Glucose.: 207 mg/dL (13 May 2023 05:21)  POCT Blood Glucose.: 217 mg/dL (13 May 2023 00:34)  POCT Blood Glucose.: 145 mg/dL (12 May 2023 17:26)      Anion Gap, Serum: 13 (05-13 @ 10:43)  Anion Gap, Serum: 15 (05-12 @ 09:47)      Calcium, Total Serum: 8.9 (05-13 @ 10:43)  Calcium, Total Serum: 9.0 (05-12 @ 09:47)          05-13    150<H>  |  115<H>  |  88<H>  ----------------------------<  185<H>  3.6   |  22  |  1.68<H>    Ca    8.9      13 May 2023 10:43                          8.3    18.90 )-----------( 237      ( 13 May 2023 10:43 )             26.6     Medications  MEDICATIONS  (STANDING):  aspirin  chewable 81 milliGRAM(s) Oral daily  carvedilol 6.25 milliGRAM(s) Oral every 12 hours  cefepime   IVPB      cefepime   IVPB 2000 milliGRAM(s) IV Intermittent every 24 hours  dextrose 5%. 1000 milliLiter(s) (50 mL/Hr) IV Continuous <Continuous>  dextrose 5%. 1000 milliLiter(s) (100 mL/Hr) IV Continuous <Continuous>  dextrose 50% Injectable 25 Gram(s) IV Push once  dextrose 50% Injectable 12.5 Gram(s) IV Push once  dextrose 50% Injectable 25 Gram(s) IV Push once  glucagon  Injectable 1 milliGRAM(s) IntraMuscular once  heparin   Injectable 5000 Unit(s) SubCutaneous every 12 hours  hydrALAZINE 20 milliGRAM(s) Oral every 8 hours  insulin lispro (ADMELOG) corrective regimen sliding scale   SubCutaneous every 6 hours  insulin NPH human recombinant 7 Unit(s) SubCutaneous every 6 hours  latanoprost 0.005% Ophthalmic Solution 1 Drop(s) Both EYES at bedtime  Nephro-penny 1 Tablet(s) Oral daily  sodium chloride 0.9%. 1000 milliLiter(s) (75 mL/Hr) IV Continuous <Continuous>  thiamine 100 milliGRAM(s) Oral daily      Physical Exam  General: Patient appears comfortable.  Vital Signs Last 12 Hrs  T(F): 96.5 (05-13-23 @ 11:58), Max: 96.6 (05-13-23 @ 04:00)  HR: 92 (05-13-23 @ 11:58) (89 - 92)  BP: 151/64 (05-13-23 @ 11:58) (151/64 - 177/64)  BP(mean): --  RR: 18 (05-13-23 @ 11:58) (18 - 18)  SpO2: 100% (05-13-23 @ 11:58) (99% - 100%)  Neck: No palpable thyroid nodules.  CVS: S1S2, No murmurs  Respiratory: No wheezing, no crepitations  GI: Abdomen soft, non tender.    Diagnostics        Radiology:        Elvira

## 2024-06-13 ENCOUNTER — APPOINTMENT (OUTPATIENT)
Dept: OBGYN | Facility: CLINIC | Age: 29
End: 2024-06-13

## 2024-10-29 NOTE — ED ADULT NURSE NOTE - TEMPLATE LIST FOR HEAD TO TOE ASSESSMENT
Pharmacy calling to get a medication refill on medications attached     Disp Refills Start End SANDRA   empagliflozin (JARDIANCE) 25 MG TABS tablet 90 tablet 0 7/11/2024 -- No   Sig - Route: Take 1 tablet (25 mg) by mouth daily - Oral   Sent to pharmacy as: Empagliflozin 25 MG Oral Tablet (JARDIANCE)   Class: E-Prescribe   Order: [id number]   E-Prescribing Status: Receipt confirmed by pharmacy (7/11/2024  9:40 AM CDT)      General